# Patient Record
Sex: FEMALE | Race: OTHER | Employment: UNEMPLOYED | URBAN - METROPOLITAN AREA
[De-identification: names, ages, dates, MRNs, and addresses within clinical notes are randomized per-mention and may not be internally consistent; named-entity substitution may affect disease eponyms.]

---

## 2022-10-10 LAB
CHLAMYDIA, EXTERNAL: NEGATIVE
HBSAG, EXTERNAL: NON REACTIVE
HEPATITIS C AB,   EXT: NEGATIVE
HIV, EXTERNAL: NON REACTIVE
N. GONORRHEA, EXTERNAL: NEGATIVE
RUBELLA, EXTERNAL: NORMAL
T. PALLIDUM, EXTERNAL: NON REACTIVE
TYPE, ABO & RH, EXTERNAL: NORMAL

## 2023-01-05 LAB — T. PALLIDUM, EXTERNAL: NON REACTIVE

## 2023-02-28 LAB — GRBS, EXTERNAL: POSITIVE

## 2023-03-20 ENCOUNTER — ANESTHESIA (OUTPATIENT)
Dept: LABOR AND DELIVERY | Age: 33
DRG: 542 | End: 2023-03-20
Payer: MEDICAID

## 2023-03-20 ENCOUNTER — ANESTHESIA EVENT (OUTPATIENT)
Dept: LABOR AND DELIVERY | Age: 33
DRG: 542 | End: 2023-03-20
Payer: MEDICAID

## 2023-03-20 ENCOUNTER — HOSPITAL ENCOUNTER (INPATIENT)
Age: 33
LOS: 2 days | Discharge: HOME | DRG: 542 | End: 2023-03-22
Attending: STUDENT IN AN ORGANIZED HEALTH CARE EDUCATION/TRAINING PROGRAM | Admitting: OBSTETRICS & GYNECOLOGY
Payer: MEDICAID

## 2023-03-20 PROBLEM — Z37.9 NORMAL LABOR: Status: ACTIVE | Noted: 2023-03-20

## 2023-03-20 LAB
ABO + RH BLD: NORMAL
AMPHET UR QL SCN: NEGATIVE
BARBITURATES UR QL SCN: NEGATIVE
BASOPHILS # BLD: 0.1 K/UL (ref 0–0.1)
BASOPHILS NFR BLD: 1 % (ref 0–1)
BENZODIAZ UR QL: NEGATIVE
BLOOD GROUP ANTIBODIES SERPL: NORMAL
CANNABINOIDS UR QL SCN: NEGATIVE
COCAINE UR QL SCN: NEGATIVE
DIFFERENTIAL METHOD BLD: ABNORMAL
DRUG SCRN COMMENT,DRGCM: NORMAL
EOSINOPHIL # BLD: 0.2 K/UL (ref 0–0.4)
EOSINOPHIL NFR BLD: 1 % (ref 0–7)
ERYTHROCYTE [DISTWIDTH] IN BLOOD BY AUTOMATED COUNT: 13.7 % (ref 11.5–14.5)
HCT VFR BLD AUTO: 39.5 % (ref 35–47)
HGB BLD-MCNC: 13.4 G/DL (ref 11.5–16)
IMM GRANULOCYTES # BLD AUTO: 0.1 K/UL (ref 0–0.04)
IMM GRANULOCYTES NFR BLD AUTO: 1 % (ref 0–0.5)
LYMPHOCYTES # BLD: 2.7 K/UL (ref 0.8–3.5)
LYMPHOCYTES NFR BLD: 23 % (ref 12–49)
MCH RBC QN AUTO: 31.1 PG (ref 26–34)
MCHC RBC AUTO-ENTMCNC: 33.9 G/DL (ref 30–36.5)
MCV RBC AUTO: 91.6 FL (ref 80–99)
METHADONE UR QL: NEGATIVE
MONOCYTES # BLD: 1 K/UL (ref 0–1)
MONOCYTES NFR BLD: 8 % (ref 5–13)
NEUTS SEG # BLD: 8 K/UL (ref 1.8–8)
NEUTS SEG NFR BLD: 66 % (ref 32–75)
NRBC # BLD: 0 K/UL (ref 0–0.01)
NRBC BLD-RTO: 0 PER 100 WBC
OPIATES UR QL: NEGATIVE
PCP UR QL: NEGATIVE
PLATELET # BLD AUTO: 212 K/UL (ref 150–400)
PMV BLD AUTO: 11.2 FL (ref 8.9–12.9)
RBC # BLD AUTO: 4.31 M/UL (ref 3.8–5.2)
SPECIMEN EXP DATE BLD: NORMAL
WBC # BLD AUTO: 11.9 K/UL (ref 3.6–11)

## 2023-03-20 PROCEDURE — 74011000250 HC RX REV CODE- 250: Performed by: ANESTHESIOLOGY

## 2023-03-20 PROCEDURE — 75410000002 HC LABOR FEE PER 1 HR

## 2023-03-20 PROCEDURE — 74011250636 HC RX REV CODE- 250/636

## 2023-03-20 PROCEDURE — 00HU33Z INSERTION OF INFUSION DEVICE INTO SPINAL CANAL, PERCUTANEOUS APPROACH: ICD-10-PCS | Performed by: ANESTHESIOLOGY

## 2023-03-20 PROCEDURE — 80307 DRUG TEST PRSMV CHEM ANLYZR: CPT

## 2023-03-20 PROCEDURE — 74011000250 HC RX REV CODE- 250: Performed by: OBSTETRICS & GYNECOLOGY

## 2023-03-20 PROCEDURE — 74011000258 HC RX REV CODE- 258: Performed by: OBSTETRICS & GYNECOLOGY

## 2023-03-20 PROCEDURE — 85025 COMPLETE CBC W/AUTO DIFF WBC: CPT

## 2023-03-20 PROCEDURE — 75410000000 HC DELIVERY VAGINAL/SINGLE

## 2023-03-20 PROCEDURE — 74011000250 HC RX REV CODE- 250

## 2023-03-20 PROCEDURE — 36415 COLL VENOUS BLD VENIPUNCTURE: CPT

## 2023-03-20 PROCEDURE — 76060000078 HC EPIDURAL ANESTHESIA

## 2023-03-20 PROCEDURE — 75410000003 HC RECOV DEL/VAG/CSECN EA 0.5 HR

## 2023-03-20 PROCEDURE — 0DQR0ZZ REPAIR ANAL SPHINCTER, OPEN APPROACH: ICD-10-PCS | Performed by: OBSTETRICS & GYNECOLOGY

## 2023-03-20 PROCEDURE — 74011250637 HC RX REV CODE- 250/637: Performed by: OBSTETRICS & GYNECOLOGY

## 2023-03-20 PROCEDURE — 86900 BLOOD TYPING SEROLOGIC ABO: CPT

## 2023-03-20 PROCEDURE — 65410000002 HC RM PRIVATE OB

## 2023-03-20 PROCEDURE — 77030014125 HC TY EPDRL BBMI -B: Performed by: ANESTHESIOLOGY

## 2023-03-20 PROCEDURE — 76815 OB US LIMITED FETUS(S): CPT

## 2023-03-20 PROCEDURE — 74011250636 HC RX REV CODE- 250/636: Performed by: OBSTETRICS & GYNECOLOGY

## 2023-03-20 PROCEDURE — 10907ZC DRAINAGE OF AMNIOTIC FLUID, THERAPEUTIC FROM PRODUCTS OF CONCEPTION, VIA NATURAL OR ARTIFICIAL OPENING: ICD-10-PCS | Performed by: OBSTETRICS & GYNECOLOGY

## 2023-03-20 RX ORDER — EPHEDRINE SULFATE/0.9% NACL/PF 50 MG/5 ML
12.5 SYRINGE (ML) INTRAVENOUS ONCE
Status: COMPLETED | OUTPATIENT
Start: 2023-03-20 | End: 2023-03-20

## 2023-03-20 RX ORDER — WATER FOR INJECTION,STERILE
VIAL (ML) INJECTION
Status: DISPENSED
Start: 2023-03-20 | End: 2023-03-21

## 2023-03-20 RX ORDER — SODIUM CHLORIDE 0.9 % (FLUSH) 0.9 %
5-40 SYRINGE (ML) INJECTION EVERY 8 HOURS
Status: DISCONTINUED | OUTPATIENT
Start: 2023-03-20 | End: 2023-03-22

## 2023-03-20 RX ORDER — BUPIVACAINE HYDROCHLORIDE AND EPINEPHRINE 2.5; 5 MG/ML; UG/ML
INJECTION, SOLUTION EPIDURAL; INFILTRATION; INTRACAUDAL; PERINEURAL AS NEEDED
Status: DISCONTINUED | OUTPATIENT
Start: 2023-03-20 | End: 2023-03-20 | Stop reason: HOSPADM

## 2023-03-20 RX ORDER — OXYCODONE AND ACETAMINOPHEN 5; 325 MG/1; MG/1
1 TABLET ORAL
Status: DISCONTINUED | OUTPATIENT
Start: 2023-03-20 | End: 2023-03-22 | Stop reason: HOSPADM

## 2023-03-20 RX ORDER — FENTANYL/BUPIVACAINE/NS/PF 2-1250MCG
SYRINGE (ML) EPIDURAL
Status: COMPLETED
Start: 2023-03-20 | End: 2023-03-20

## 2023-03-20 RX ORDER — OXYTOCIN/RINGER'S LACTATE 30/500 ML
87.3 PLASTIC BAG, INJECTION (ML) INTRAVENOUS AS NEEDED
Status: DISCONTINUED | OUTPATIENT
Start: 2023-03-20 | End: 2023-03-21

## 2023-03-20 RX ORDER — EPHEDRINE SULFATE/0.9% NACL/PF 50 MG/5 ML
12.5 SYRINGE (ML) INTRAVENOUS
Status: ACTIVE | OUTPATIENT
Start: 2023-03-20 | End: 2023-03-21

## 2023-03-20 RX ORDER — SODIUM CHLORIDE, SODIUM LACTATE, POTASSIUM CHLORIDE, CALCIUM CHLORIDE 600; 310; 30; 20 MG/100ML; MG/100ML; MG/100ML; MG/100ML
125 INJECTION, SOLUTION INTRAVENOUS CONTINUOUS
Status: DISCONTINUED | OUTPATIENT
Start: 2023-03-20 | End: 2023-03-20

## 2023-03-20 RX ORDER — CEFAZOLIN SODIUM 1 G/3ML
INJECTION, POWDER, FOR SOLUTION INTRAMUSCULAR; INTRAVENOUS
Status: DISPENSED
Start: 2023-03-20 | End: 2023-03-21

## 2023-03-20 RX ORDER — NALOXONE HYDROCHLORIDE 0.4 MG/ML
0.4 INJECTION, SOLUTION INTRAMUSCULAR; INTRAVENOUS; SUBCUTANEOUS AS NEEDED
Status: DISCONTINUED | OUTPATIENT
Start: 2023-03-20 | End: 2023-03-21

## 2023-03-20 RX ORDER — SODIUM CHLORIDE 0.9 % (FLUSH) 0.9 %
5-40 SYRINGE (ML) INJECTION EVERY 8 HOURS
Status: DISCONTINUED | OUTPATIENT
Start: 2023-03-20 | End: 2023-03-20

## 2023-03-20 RX ORDER — NALOXONE HYDROCHLORIDE 0.4 MG/ML
0.4 INJECTION, SOLUTION INTRAMUSCULAR; INTRAVENOUS; SUBCUTANEOUS AS NEEDED
Status: DISCONTINUED | OUTPATIENT
Start: 2023-03-20 | End: 2023-03-22 | Stop reason: HOSPADM

## 2023-03-20 RX ORDER — SODIUM CHLORIDE 0.9 % (FLUSH) 0.9 %
5-40 SYRINGE (ML) INJECTION AS NEEDED
Status: DISCONTINUED | OUTPATIENT
Start: 2023-03-20 | End: 2023-03-22

## 2023-03-20 RX ORDER — DOCUSATE SODIUM 100 MG/1
100 CAPSULE, LIQUID FILLED ORAL 2 TIMES DAILY
Status: DISCONTINUED | OUTPATIENT
Start: 2023-03-20 | End: 2023-03-22 | Stop reason: HOSPADM

## 2023-03-20 RX ORDER — OXYTOCIN/RINGER'S LACTATE 30/500 ML
10 PLASTIC BAG, INJECTION (ML) INTRAVENOUS AS NEEDED
Status: COMPLETED | OUTPATIENT
Start: 2023-03-20 | End: 2023-03-20

## 2023-03-20 RX ORDER — BUPIVACAINE HYDROCHLORIDE AND EPINEPHRINE 2.5; 5 MG/ML; UG/ML
INJECTION, SOLUTION EPIDURAL; INFILTRATION; INTRACAUDAL; PERINEURAL
Status: COMPLETED
Start: 2023-03-20 | End: 2023-03-20

## 2023-03-20 RX ORDER — HYDROCORTISONE ACETATE PRAMOXINE HCL 2.5; 1 G/100G; G/100G
CREAM TOPICAL AS NEEDED
Status: DISCONTINUED | OUTPATIENT
Start: 2023-03-20 | End: 2023-03-22 | Stop reason: HOSPADM

## 2023-03-20 RX ORDER — SODIUM CHLORIDE 0.9 % (FLUSH) 0.9 %
5-40 SYRINGE (ML) INJECTION AS NEEDED
Status: DISCONTINUED | OUTPATIENT
Start: 2023-03-20 | End: 2023-03-21

## 2023-03-20 RX ORDER — OXYTOCIN/RINGER'S LACTATE 30/500 ML
PLASTIC BAG, INJECTION (ML) INTRAVENOUS
Status: COMPLETED
Start: 2023-03-20 | End: 2023-03-20

## 2023-03-20 RX ORDER — IBUPROFEN 400 MG/1
800 TABLET ORAL EVERY 8 HOURS
Status: DISCONTINUED | OUTPATIENT
Start: 2023-03-20 | End: 2023-03-22 | Stop reason: HOSPADM

## 2023-03-20 RX ORDER — FENTANYL/BUPIVACAINE/NS/PF 2-1250MCG
1-16 SYRINGE (ML) EPIDURAL CONTINUOUS
Status: DISCONTINUED | OUTPATIENT
Start: 2023-03-20 | End: 2023-03-20

## 2023-03-20 RX ORDER — EPHEDRINE SULFATE/0.9% NACL/PF 50 MG/5 ML
12.5 SYRINGE (ML) INTRAVENOUS
Status: COMPLETED | OUTPATIENT
Start: 2023-03-20 | End: 2023-03-20

## 2023-03-20 RX ADMIN — Medication 12 ML/HR: at 01:58

## 2023-03-20 RX ADMIN — DOCUSATE SODIUM 100 MG: 100 CAPSULE ORAL at 18:11

## 2023-03-20 RX ADMIN — Medication 10000 MILLI-UNITS: at 16:17

## 2023-03-20 RX ADMIN — SODIUM CHLORIDE 2.5 MILLION UNITS: 9 INJECTION, SOLUTION INTRAVENOUS at 11:42

## 2023-03-20 RX ADMIN — SODIUM CHLORIDE, POTASSIUM CHLORIDE, SODIUM LACTATE AND CALCIUM CHLORIDE 999 ML/HR: 600; 310; 30; 20 INJECTION, SOLUTION INTRAVENOUS at 01:18

## 2023-03-20 RX ADMIN — Medication 12 ML/HR: at 08:39

## 2023-03-20 RX ADMIN — Medication 12 ML/HR: at 14:41

## 2023-03-20 RX ADMIN — SODIUM CHLORIDE, POTASSIUM CHLORIDE, SODIUM LACTATE AND CALCIUM CHLORIDE 125 ML/HR: 600; 310; 30; 20 INJECTION, SOLUTION INTRAVENOUS at 11:43

## 2023-03-20 RX ADMIN — OXYTOCIN 10000 MILLI-UNITS: 10 INJECTION INTRAVENOUS at 16:17

## 2023-03-20 RX ADMIN — BUPIVACAINE HYDROCHLORIDE AND EPINEPHRINE 4 ML: 2.5; 5 INJECTION, SOLUTION EPIDURAL; INFILTRATION; INTRACAUDAL; PERINEURAL at 01:52

## 2023-03-20 RX ADMIN — IBUPROFEN 800 MG: 400 TABLET ORAL at 18:11

## 2023-03-20 RX ADMIN — Medication 12.5 MG: at 03:05

## 2023-03-20 RX ADMIN — SODIUM CHLORIDE 5 MILLION UNITS: 900 INJECTION INTRAVENOUS at 01:51

## 2023-03-20 RX ADMIN — SODIUM CHLORIDE 2.5 MILLION UNITS: 9 INJECTION, SOLUTION INTRAVENOUS at 07:08

## 2023-03-20 RX ADMIN — BUPIVACAINE HYDROCHLORIDE AND EPINEPHRINE 3 ML: 2.5; 5 INJECTION, SOLUTION EPIDURAL; INFILTRATION; INTRACAUDAL; PERINEURAL at 01:51

## 2023-03-20 RX ADMIN — BUPIVACAINE HYDROCHLORIDE AND EPINEPHRINE 3 ML: 2.5; 5 INJECTION, SOLUTION EPIDURAL; INFILTRATION; INTRACAUDAL; PERINEURAL at 01:50

## 2023-03-20 RX ADMIN — CEFAZOLIN 2 G: 1 INJECTION, POWDER, FOR SOLUTION INTRAMUSCULAR; INTRAVENOUS at 16:37

## 2023-03-20 RX ADMIN — Medication 12.5 MG: at 02:28

## 2023-03-20 RX ADMIN — SODIUM CHLORIDE, POTASSIUM CHLORIDE, SODIUM LACTATE AND CALCIUM CHLORIDE 125 ML/HR: 600; 310; 30; 20 INJECTION, SOLUTION INTRAVENOUS at 01:50

## 2023-03-20 NOTE — ANESTHESIA PREPROCEDURE EVALUATION
Relevant Problems   No relevant active problems       Anesthetic History   No history of anesthetic complications            Review of Systems / Medical History  Patient summary reviewed and pertinent labs reviewed    Pulmonary  Within defined limits                 Neuro/Psych   Within defined limits           Cardiovascular  Within defined limits                Exercise tolerance: >4 METS     GI/Hepatic/Renal  Within defined limits              Endo/Other  Within defined limits           Other Findings              Physical Exam    Airway  Mallampati: II  TM Distance: > 6 cm  Neck ROM: normal range of motion   Mouth opening: Normal     Cardiovascular  Regular rate and rhythm,  S1 and S2 normal,  no murmur, click, rub, or gallop  Rhythm: regular  Rate: normal         Dental  No notable dental hx       Pulmonary  Breath sounds clear to auscultation               Abdominal  GI exam deferred       Other Findings            Anesthetic Plan    ASA: 2  Anesthesia type: epidural      Post-op pain plan if not by surgeon: indwelling epidural catheter    Induction: Intravenous  Anesthetic plan and risks discussed with: Patient

## 2023-03-20 NOTE — PROGRESS NOTES
Problem: Vaginal Delivery: Day of Deliver-Laboring  Goal: Activity/Safety  Outcome: Progressing Towards Goal  Goal: Consults, if ordered  Outcome: Progressing Towards Goal  Goal: Diagnostic Test/Procedures  Outcome: Progressing Towards Goal  Goal: Nutrition/Diet  Outcome: Progressing Towards Goal  Goal: Discharge Planning  Outcome: Progressing Towards Goal  Goal: Medications  Outcome: Progressing Towards Goal  Goal: Respiratory  Outcome: Progressing Towards Goal  Goal: Treatments/Interventions/Procedures  Outcome: Progressing Towards Goal  Goal: *Vital signs within defined limits  Outcome: Progressing Towards Goal  Goal: *Labs within defined limits  Outcome: Progressing Towards Goal  Goal: *Hemodynamically stable  Outcome: Progressing Towards Goal  Goal: *Optimal pain control at patient's stated goal  Outcome: Progressing Towards Goal

## 2023-03-20 NOTE — PROGRESS NOTES
0708: Bedside and Verbal shift change report given to BERTHA Rico, RN (oncoming nurse) by IRAIS Dickens [facility], [address] [place] [address] (offgoing nurse). Report included the following information SBAR and Kardex. 0170: Dr. Aleksander Staples at bedside for introduction     : Dr. Aleksander Staples at bedside for evaluation. AROM and SVE at this time     1303: Dr. Aleksander Staples at bedside for evaluation     1311: Patient begins pushing. RN remains in continuous attendance at the bedside. Assessment & evaluation of fetal heart rate ongoing via continuous EFM. 1512: pt pushing on R side     1528: pt pushing in knee/chest position     1550: Dr. Aleksander Staples at bedside for evaluation and to assess progression of labor. 8206-1365: Dr. Aleksander Staples at bedside for evaluation and to assess progression of labor. 1607:  of live baby girl     80: Dr. Aleksander Staples straight cathed pt at this time     1908: Bedside and Verbal shift change report given to A. Vergil Fothergill, RN (oncoming nurse) by BERTHA Rico RN (offgoing nurse). Report included the following information SBAR and Kardex. [facility]  Interdisciplinary Discharge Instructions      Date of Discharge: 7/11/2019       Ambulatory: Yes  Time of Discharge: Pending     Transportation: TBD  Residence Upon Discharge: Home          Sharps / Valuables Returned: Yes    Discharge Medications: Yes    Discharge Prescriptions: Yes    Instructions given by: RN  Patients's own medication returned: N/A    Written educational materials given: Yes  Satisfaction survey completed: Unknown      I have received instruction in these areas and have had an opportunity to ask questions, understand what has been discussed, and have received a copy of this form.or therapeutic reasons of current patients and for your continued recovery, visitation is not allowed on this unit for thirty days.      _____________________________________ ________________  Signature of Patient     Date/Time      _____________________________________ ________________  Signature of POA/Guardian/Parent              Date/Time      _____________________________________ ________________  Signature of Caregiver    Date/Time          Medication Drop Box Locations - NO NEEDLES IN MEDICATION DROP BOXES    *Liquid Medications must be in ORIGINAL bottles and securely closed. Please place the   bottle in a sealed plastic bag before depositing it in the box.    *It is preferred that medications remain in their original packaging or prescription bottle.    Cross out your name & address, but DO NOT cover up the name of the medication.      [place] [employer]  [address] [facility]. [phone]    [place] [employer] [address].  [phone]    [place] [employer] [address] [facility]. [phone]    [place] [employer] [address]. [phone]    [place] [employer] [address] [facility]. [phone]      Where to dispose needles?    [facility]'s Emergency Department in  [place] maintains a free drop off box for needles.  Contaminated sharps must be placed in an approved red, plastic, puncture-proof container or a used plastic laundry detergent bottle labeled \"sharps\" - No larger than 11 x 7.5 inches.

## 2023-03-20 NOTE — PROGRESS NOTES
Problem: Vaginal Delivery: Day of Deliver-Laboring  Goal: Activity/Safety  3/20/2023 1623 by Enrique Rae, RN  Outcome: Resolved/Not Met  3/20/2023 0719 by Enrique Rae, RN  Outcome: Progressing Towards Goal  Goal: Consults, if ordered  3/20/2023 1623 by Enrique Rae, RN  Outcome: Resolved/Not Met  3/20/2023 0719 by Enrique Rae, RN  Outcome: Progressing Towards Goal  Goal: Diagnostic Test/Procedures  3/20/2023 1623 by Enrique Rae, RN  Outcome: Resolved/Not Met  3/20/2023 0719 by Enrique Rae, RN  Outcome: Progressing Towards Goal  Goal: Nutrition/Diet  3/20/2023 1623 by Enrique Rae, RN  Outcome: Resolved/Not Met  3/20/2023 0719 by Enrique Rae, RN  Outcome: Progressing Towards Goal  Goal: Discharge Planning  3/20/2023 1623 by Enrique Rae, RN  Outcome: Resolved/Not Met  3/20/2023 0719 by Enrique Rae, RN  Outcome: Progressing Towards Goal  Goal: Medications  3/20/2023 1623 by Enrique Rae, RN  Outcome: Resolved/Not Met  3/20/2023 0719 by Enrique Rae, RN  Outcome: Progressing Towards Goal  Goal: Respiratory  3/20/2023 1623 by Enrique Rae, RN  Outcome: Resolved/Not Met  3/20/2023 0719 by Enrique Rae, RN  Outcome: Progressing Towards Goal  Goal: Treatments/Interventions/Procedures  3/20/2023 1623 by Enrique Rae, RN  Outcome: Resolved/Not Met  3/20/2023 0719 by Enrique Rae, RN  Outcome: Progressing Towards Goal  Goal: *Vital signs within defined limits  3/20/2023 1623 by Enrique Rae, RN  Outcome: Resolved/Not Met  3/20/2023 0719 by Enrique Rae, RN  Outcome: Progressing Towards Goal  Goal: *Labs within defined limits  3/20/2023 1623 by Enrique Rae, RN  Outcome: Resolved/Not Met  3/20/2023 0719 by Enrique Rae, RN  Outcome: Progressing Towards Goal  Goal: *Hemodynamically stable  3/20/2023 1623 by Enrique Rae, RN  Outcome: Resolved/Not Met  3/20/2023 0719 by Enrique Rae, RN  Outcome: Progressing Towards Goal  Goal: *Optimal pain control at patient's stated goal  3/20/2023 1623 by Enrique Rae RN  Outcome: Resolved/Not Met  3/20/2023 0719 by Nate Jaffe, RN  Outcome: Progressing Towards Goal

## 2023-03-20 NOTE — L&D DELIVERY NOTE
Delivery Summary  Patient: Nydia Arora             Circumcision:   NA-female  Additional Delivery Comments - Pt presented in labor. She progressed without augmentation to 8/100/-1 when arom was performed. At c/c/+1 she began pushing. She pushed for almost 3 hours and fetal head was at +2 station and she was exhausted. Fetal head was OT to was rotated to 45 degrees. She was consented for vacuum assisted delivery. Her epidural was redosed. I&O cath was done to drain the bladder. Miti vac did not fit well into the introitus so a flat kiwi vacuum was applied. Over 3 contractions gentle downward traction was applied. Midline episiotomy was cut to help facilitate delivery due to tight introitus. Fetal head delivered followed easily by shoulders and body. Baby place on maternal abdomen with good cry. Cord clamped and cut after 60 sec delay. Cord blood obtained. Placenta delivered spontaneously and intact and was normal in appearance. 3rd degree perineal lac noted. It was a 3C laceration. A rectal exam revealed intact rectal mucosa. The anal sphincter muscle was identified and grasped with allis clamps. She noted some pain so 10 cc on 15 lidocaine was injected into the laceration. The anal sphincter was closed with 4 figure of 8 sutures. There were bilateral deep sulcus tears. Dr. Shayy Lozano was asked to come help retract. The sulcus tears were closed with running 2-0 and 3-0 chromic suture. There perineal body and remainder of the perineal laceration were repaired in standard fashion. 2 fingers easily went into vaginal opening. Repeat rectal exam revealed intact mucosa and no palpable suture. The lacerations were hemostatic, uterus was firm and bleeding was appropriate.     Information for the patient's :  Daniel Joseph [[id number]]     Delivery Type: Vaginal, Spontaneous   Delivery Date: 3/20/2023   Delivery Time: 4:07 PM     Birth Weight:       Sex:  female  Delivery Clinician:  Sarbjit Moore Gestational Age: 38w3d    Presentation: Vertex   Position:             Apgars were 8  and 9      Resuscitation Method: Suctioning-tracheal;Suctioning-bulb     Meconium Stained: None    Living Status: Living       Placenta Date/Time: 3/20/2023  4:17 PM   Placenta Removal: Spontaneous   Placenta Appearance: Intact    Cord Information:      Cord Events:         Disposition of Cord Blood:      Blood Gases Sent?:        Cord pH:  none    Episiotomy:    Laceration(s): 3rd     Estimated Blood Loss (ml): No data found    Labor Events  Method: None      Augmentation: AROM   Cervical Ripening:     None        Operative Vaginal Delivery - Consent & Procedure: The indications, risks, and benefits of operative vaginal delivery compared to  delivery were discussed with the patient and attendant family member. All questions were answered. Bladder was drained prior to instrumentation.  Description of procedure: as above

## 2023-03-20 NOTE — PROGRESS NOTES
Problem: Vaginal Delivery: Day of Deliver-Laboring  Goal: Activity/Safety  Outcome: Progressing Towards Goal  Goal: Diagnostic Test/Procedures  Outcome: Progressing Towards Goal  Goal: Nutrition/Diet  Outcome: Progressing Towards Goal  Goal: Discharge Planning  Outcome: Progressing Towards Goal  Goal: Medications  Outcome: Progressing Towards Goal  Goal: Respiratory  Outcome: Progressing Towards Goal  Goal: Treatments/Interventions/Procedures  Outcome: Progressing Towards Goal  Goal: *Vital signs within defined limits  Outcome: Progressing Towards Goal  Goal: *Labs within defined limits  Outcome: Progressing Towards Goal  Goal: *Hemodynamically stable  Outcome: Progressing Towards Goal  Goal: *Optimal pain control at patient's stated goal  Outcome: Progressing Towards Goal

## 2023-03-20 NOTE — PROGRESS NOTES
Epidural.  FHT:  130 baseline, moderate variability, +15x15 accels, no decels, Cat I/reactive  [place]:  q 2 min spontaneously  RN exam at 0544 -> 8/C/0/intact    A:  32 yo G1 at 39+3 laboring well spontaneously. Reassuring fetal status. GBS pos. Rh pos.  RI.    P:  1. Continue to monitor  2. AROM if indicated (currently progressing well without intervention)  3. Continue PCN GBS proph  4.   Anticipate vaginal delivery

## 2023-03-20 NOTE — H&P
OB H&P    Patient: Cory Desai Age: 33 y.o. Sex: female    YOB: 1990 Admit Date: 3/20/2023 PCP: No primary care provider on file. MRN: [id number]  CSN: [id number]     Room: [address] [place] [address] Time Dictated: 12:50 AM        Chief Complaint   Contractions     History of Present Illness   33 y.o. G1 at 39+3 presents c/o regular, painful uterine contractions that started approximately 1.5 hours ago. Denies LOF/VB.  +FM. Reports some hand/pedal edema. Denies HA/vision changes/RUQ pain. Desires epidural soonest.  Speaks Farsi (desired  as ). Primary OB:  [facility]:  - Cervical polyp at os  - GBS pos    PNL:  O pos  Ab neg  TPA NR  RI  HBsAg NR  HCV Ab neg  HIV NR  GC/Chlam neg/neg  1 hr   GBS neg    OB History    Para Term  AB Living   1             SAB IAB Ectopic Molar Multiple Live Births                    # Outcome Date GA Lbr Ang/2nd Weight Sex Delivery Anes PTL Lv   1 Current                 Review of Systems   Review of Systems   Constitutional:  Negative for chills and fever. HENT:  Negative for congestion, facial swelling and sore throat. Eyes:  Negative for visual disturbance. Respiratory:  Negative for cough, shortness of breath and wheezing. Cardiovascular:  Positive for leg swelling. Negative for chest pain and palpitations. Gastrointestinal:  Positive for abdominal pain. Negative for constipation, diarrhea, nausea and vomiting. Endocrine: Negative. Genitourinary:  Negative for dysuria, vaginal bleeding and vaginal discharge. Musculoskeletal:  Positive for back pain. Skin: Negative. Allergic/Immunologic: Negative for immunocompromised state. Neurological:  Negative for light-headedness and headaches. Hematological: Negative. Psychiatric/Behavioral: Negative.         Past Medical/Surgical History     PMHX:  Reviewed and none    Past Surgical History:   Procedure Laterality Date    HX CYST REMOVAL - ovarian via laparatomy (outside of [place])         Social History     Social History     Socioeconomic History    Marital status:      Spouse name: Not on file    Number of children: None    Years of education: Not on file    Highest education level: Not on file   Occupational History    Not on file   Tobacco Use    Smoking status: Never    Smokeless tobacco: Never   Vaping Use    Vaping Use: Never used   Substance and Sexual Activity    Alcohol use: Never    Drug use: Never    Sexual activity: Not on file   Other Topics Concern     Service No    Blood Transfusions No    Caffeine Concern No    Occupational Exposure No    Hobby Hazards No    Sleep Concern No    Stress Concern No    Weight Concern No    Special Diet No    Back Care No    Exercise No    Bike Helmet No    Seat Belt Yes    Self-Exams No   Social History Narrative    Not on file       Family History     Reviewed and none    Current Medications     Prior to Admission Medications   Prescriptions Last Dose Informant Patient Reported? Taking? PNV Comb #2-Iron-Omega 3-FA [id number] mg cmpk 3/19/2023  Yes Yes   Sig: Take  by mouth. Indications: pregnancy      Facility-Administered Medications: None       Allergies   No Known Allergies    Physical Exam   Patient Vitals for the past 12 hrs:   Temp Pulse Resp BP SpO2   03/20/23 0041 97.8 °F (36.6 °C) 73 18 117/76 99 %     ED Triage Vitals [03/20/23 0035]   ED Encounter Vitals Group      BP       Pulse       Resp       Temp       Temp src       SpO2       Weight 148 lb      Height 5' 5\"     Physical Exam  Vitals and nursing note reviewed. Exam conducted with a chaperone present. Constitutional:       General: She is in acute distress. Appearance: She is normal weight. She is not ill-appearing. HENT:      Head: Normocephalic and atraumatic. Nose: Nose normal.      Mouth/Throat:      Mouth: Mucous membranes are moist.   Eyes:      General: No scleral icterus.      Conjunctiva/sclera: Conjunctivae normal.   Cardiovascular:      Rate and Rhythm: Normal rate and regular rhythm. Heart sounds: Normal heart sounds. No murmur heard. Pulmonary:      Effort: Pulmonary effort is normal. No respiratory distress. Breath sounds: Normal breath sounds. No wheezing. Abdominal:      General: There is no distension. Palpations: Abdomen is soft. Tenderness: There is no abdominal tenderness. There is no guarding. Genitourinary:     General: Normal vulva. Vagina: No vaginal discharge. Rectum: Normal.   Musculoskeletal:         General: No swelling. Skin:     General: Skin is warm and dry. Neurological:      Mental Status: She is alert and oriented to person, place, and time. Psychiatric:         Mood and Affect: Mood normal.         Behavior: Behavior normal.         Thought Content: Thought content normal.         Judgment: Judgment normal.         OB Exam     Fetal Assessment: 130 baseline, moderate variability, +accels, no decels, Cat I  [place]:  q 2 -4 min    SVE:4/C/0/cephalic      Final Diagnosis     34 yo G1 at 39+3. Labor. Reassuring fetal status. GBS pos. Rh pos. RI. Disposition     Admit for delivery   PIV/CBC/T&S/UDS  PCN GBS proph  Epidural prn  Augment/AROM prn  Anticipate vaginal delivery   for standard fetal/maternal indications. Chalino Ramírez MD  [facility]. Mo Leon MD, [facility]  2023  12:50 AM      My signature above authenticates this document and my orders, the final    diagnosis(es), discharge prescription(s), and instructions in the Epic    record. Nursing notes have been reviewed by myself.

## 2023-03-20 NOTE — ANESTHESIA PROCEDURE NOTES
Epidural Block    Patient location during procedure: OB  Start time: 3/20/2023 1:45 AM  End time: 3/20/2023 1:53 AM  Reason for block: labor epidural  Staffing  Performed: attending   Anesthesiologist: Alonso Urbina MD  Preanesthetic Checklist  Completed: patient identified, IV checked, site marked, risks and benefits discussed, surgical consent, monitors and equipment checked, pre-op evaluation, timeout performed and fire risk safety assessment completed and verbalized  Block Placement  Patient position: sitting  Prep: DuraPrep  Sterility prep: cap, drape, gloves, hand and mask  Sedation level: no sedation  Patient monitoring: heart rate, continuous pulse oximetry and frequent blood pressure checks  Approach: midline  Location: lumbar  Lumbar location: L3-L4  Epidural  Loss of resistance technique: air  Guidance: landmark technique  Needle  Needle type: Tuohy   Needle gauge: 17 G  Needle length: 9 cm  Needle insertion depth: 4.5 cm  Catheter type: end hole  Catheter size: 19 G  Catheter at skin depth: 8.5 cm  Catheter securement method: clear occlusive dressing and surgical tape  Test dose: negative  Assessment  Block outcome: pain improved  Number of attempts: 1  Procedure assessment: patient tolerated procedure well with no immediate complications

## 2023-03-20 NOTE — PROGRESS NOTES
Diego Acosta is a 33 y.o.   at 39w3d patient of Dr Hill Smith at [facility] who presents to L&D triage with c/o contractions since 0 on 3/19/23. She reports Positive FM, denies LOF. She also denies Headaches, [place], and RUQ pain. Urine sample obtained. EFM and toco placed for initial assessment. 0057 Pt moved to room Aquilino Purdyscarolynn Ortiz at bedside. POC discussed. Strip reviewed. Anesthesia at bedside at 0138. Patient positioned to side of the bed, EFM & TOCO adjusted to accommodate sterile field. Difficulty tracing due to maternal position. Time out completed at 0139. Successful epidural is completed by Dr Chaim Chase. Patient is repositioned supine in bed with wedge under right hip. EFM and TOCO replaced and blood pressure frequency increased. Roberson catheter placed and epidural continuous infusion is started. Pt c/o shaking and feeling cold. Temp taken- 97.6. Temperature in room increased and extra blanket given. Will continue to monitor. 0230 TC to Dr. Lilia Red regarding pt b/p. New orders received. Bedside shift change report given to BERTHA Rico RN (oncoming nurse) by IRAIS Uriarte and CAROLINA Dickens [facility], [address] [place] [address] (offgoing nurse). Report included the following information SBAR and Kardex.

## 2023-03-21 PROCEDURE — 74011250637 HC RX REV CODE- 250/637: Performed by: OBSTETRICS & GYNECOLOGY

## 2023-03-21 PROCEDURE — 65410000002 HC RM PRIVATE OB

## 2023-03-21 RX ADMIN — DOCUSATE SODIUM 100 MG: 100 CAPSULE ORAL at 08:12

## 2023-03-21 RX ADMIN — IBUPROFEN 800 MG: 400 TABLET ORAL at 01:22

## 2023-03-21 RX ADMIN — OXYCODONE AND ACETAMINOPHEN 1 TABLET: 5; 325 TABLET ORAL at 01:22

## 2023-03-21 RX ADMIN — IBUPROFEN 800 MG: 400 TABLET ORAL at 08:12

## 2023-03-21 RX ADMIN — IBUPROFEN 800 MG: 400 TABLET ORAL at 17:05

## 2023-03-21 RX ADMIN — OXYCODONE AND ACETAMINOPHEN 1 TABLET: 5; 325 TABLET ORAL at 09:33

## 2023-03-21 RX ADMIN — DOCUSATE SODIUM 100 MG: 100 CAPSULE ORAL at 17:05

## 2023-03-21 RX ADMIN — OXYCODONE AND ACETAMINOPHEN 1 TABLET: 5; 325 TABLET ORAL at 20:59

## 2023-03-21 NOTE — PROGRESS NOTES
Post-Partum Day Number 1 Progress Note    Rola Pereira     Assessment: Doing well, post partum day 1 s/p VAVD for maternal exhaustion with midline episiotomy and 3c laceration     Plan:  - Continue routine postpartum and perineal care as well as maternal education. - 3c laceration - docusate BID, discussed importance of soft, regular bowel movements, outpatient follow up with urogyn - requested. - Plan discharge home [facility] Discharge Date: Tomorrow. Information for the patient's :  Eugenio Archibald [[id number]]   Vaginal, Vacuum (Extractor)  Patient doing well without significant complaint. Voiding without difficulty, normal lochia. Vitals:  Visit Vitals  BP (!) 94/57 (BP 1 Location: Left upper arm, BP Patient Position: At rest)   Pulse 62   Temp 97.8 °F (36.6 °C)   Resp 16   Ht 5' 5\" (1.651 m)   Wt 67.1 kg (148 lb)   SpO2 98%   Breastfeeding Unknown   BMI 24.63 kg/m²     Temp (24hrs), Av.2 °F (36.8 °C), Min:97.8 °F (36.6 °C), Max:98.6 °F (37 °C)        Exam:   Patient without distress. Fundus firm, nontender per nursing fundal checks. Perineum with normal lochia noted per nursing assessment. Lower extremities are negative for pathological edema. Labs:     Lab Results   Component Value Date/Time    WBC 11.9 (H) 2023 01:12 AM    HGB 13.4 2023 01:12 AM    HCT 39.5 2023 01:12 AM    PLATELET 277 4723 01:12 AM       No results found for this or any previous visit (from the past 24 hour(s)).

## 2023-03-21 NOTE — PROGRESS NOTES
At 0930 RN encouarged mom to get out of bed on her own and amublate around the room as tolerated. RN instructed the pt on how to change her own pad, how to make up pads to provide relief for her lonnie area. RN assessed pain as well, administered percocet and educated the pt on side effects of the medication. RN will reassess pain.

## 2023-03-21 NOTE — PROGRESS NOTES
0725. Bedside shift change report given to JANAE Colorado RN (oncoming nurse) by IRAIS Soto RN (offgoing nurse). Report included the following information SBAR, Kardex, Intake/Output, MAR, and Recent Results.

## 2023-03-22 VITALS
BODY MASS INDEX: 24.66 KG/M2 | HEIGHT: 65 IN | DIASTOLIC BLOOD PRESSURE: 57 MMHG | OXYGEN SATURATION: 96 % | HEART RATE: 64 BPM | RESPIRATION RATE: 16 BRPM | TEMPERATURE: 97.5 F | SYSTOLIC BLOOD PRESSURE: 108 MMHG | WEIGHT: 148 LBS

## 2023-03-22 PROCEDURE — 74011250637 HC RX REV CODE- 250/637: Performed by: OBSTETRICS & GYNECOLOGY

## 2023-03-22 RX ORDER — IBUPROFEN 600 MG/1
600 TABLET ORAL
Qty: 60 TABLET | Refills: 0 | Status: SHIPPED | OUTPATIENT
Start: 2023-03-22

## 2023-03-22 RX ORDER — DOCUSATE SODIUM 100 MG/1
100 CAPSULE, LIQUID FILLED ORAL
Qty: 60 CAPSULE | Refills: 0 | Status: SHIPPED | OUTPATIENT
Start: 2023-03-22

## 2023-03-22 RX ADMIN — OXYCODONE AND ACETAMINOPHEN 1 TABLET: 5; 325 TABLET ORAL at 06:15

## 2023-03-22 RX ADMIN — DOCUSATE SODIUM 100 MG: 100 CAPSULE ORAL at 09:51

## 2023-03-22 RX ADMIN — OXYCODONE AND ACETAMINOPHEN 1 TABLET: 5; 325 TABLET ORAL at 01:44

## 2023-03-22 RX ADMIN — IBUPROFEN 800 MG: 400 TABLET ORAL at 01:44

## 2023-03-22 RX ADMIN — IBUPROFEN 800 MG: 400 TABLET ORAL at 09:51

## 2023-03-22 NOTE — PROGRESS NOTES
Problem: Patient Education: Go to Patient Education Activity  Goal: Patient/Family Education  Outcome: Progressing Towards Goal     Problem: Vaginal Delivery: Day of Deliver-Laboring  Goal: Activity/Safety  Outcome: Progressing Towards Goal  Goal: Diagnostic Test/Procedures  Outcome: Progressing Towards Goal  Goal: Nutrition/Diet  Outcome: Progressing Towards Goal  Goal: Discharge Planning  Outcome: Progressing Towards Goal  Goal: Medications  Outcome: Progressing Towards Goal  Goal: Respiratory  Outcome: Progressing Towards Goal  Goal: Treatments/Interventions/Procedures  Outcome: Progressing Towards Goal  Goal: *Vital signs within defined limits  Outcome: Progressing Towards Goal  Goal: *Labs within defined limits  Outcome: Progressing Towards Goal  Goal: *Hemodynamically stable  Outcome: Progressing Towards Goal  Goal: *Optimal pain control at patient's stated goal  Outcome: Progressing Towards Goal     Problem: Vaginal Delivery: Day of Delivery-Post delivery  Goal: Activity/Safety  Outcome: Progressing Towards Goal  Goal: Nutrition/Diet  Outcome: Progressing Towards Goal  Goal: Discharge Planning  Outcome: Progressing Towards Goal  Goal: Medications  Outcome: Progressing Towards Goal  Goal: Treatments/Interventions/Procedures  Outcome: Progressing Towards Goal  Goal: *Vital signs within defined limits  Outcome: Progressing Towards Goal  Goal: *Labs within defined limits  Outcome: Progressing Towards Goal  Goal: *Hemodynamically stable  Outcome: Progressing Towards Goal  Goal: *Optimal pain control at patient's stated goal  Outcome: Progressing Towards Goal  Goal: *Participates in infant care  Outcome: Progressing Towards Goal  Goal: *Demonstrates progressive activity  Outcome: Progressing Towards Goal  Goal: *Tolerating diet  Outcome: Progressing Towards Goal     Problem: Vaginal Delivery: Postpartum Day 1  Goal: Activity/Safety  Outcome: Progressing Towards Goal  Goal: Diagnostic Test/Procedures  Outcome: Progressing Towards Goal  Goal: Nutrition/Diet  Outcome: Progressing Towards Goal  Goal: Discharge Planning  Outcome: Progressing Towards Goal  Goal: Medications  Outcome: Progressing Towards Goal  Goal: Treatments/Interventions/Procedures  Outcome: Progressing Towards Goal  Goal: Psychosocial  Outcome: Progressing Towards Goal  Goal: *Vital signs within defined limits  Outcome: Progressing Towards Goal  Goal: *Labs within defined limits  Outcome: Progressing Towards Goal  Goal: *Hemodynamically stable  Outcome: Progressing Towards Goal  Goal: *Optimal pain control at patient's stated goal  Outcome: Progressing Towards Goal  Goal: *Participates in infant care  Outcome: Progressing Towards Goal  Goal: *Demonstrates progressive activity  Outcome: Progressing Towards Goal  Goal: *Performs self perineal care  Outcome: Progressing Towards Goal  Goal: *Appropriate parent-infant bonding  Outcome: Progressing Towards Goal  Goal: *Tolerating diet  Outcome: Progressing Towards Goal  Goal: *Performs self breast care  Outcome: Progressing Towards Goal

## 2023-03-22 NOTE — PROGRESS NOTES
Bedside shift change report given to IRAIS Drew RN (oncoming nurse) by JANAE Montes RN (offgoing nurse). Report included the following information SBAR and Kardex. Bedside shift change report given to JANAE Colorado RN (oncoming nurse) by IRAIS rDew RN (offgoing nurse). Report included the following information SBAR and Kardex.

## 2023-03-22 NOTE — DISCHARGE INSTRUCTIONS
After Your Delivery (the Postpartum Period): Care Instructions  Overview     Congratulations on the birth of your baby. Like pregnancy, the  period can be a time of excitement, china, and exhaustion. You may look at your wondrous little baby and feel happy. You may also be overwhelmed by your new sleep hours and new responsibilities. At first, babies often sleep during the days and are awake at night. They do not have a pattern or routine. They may make sudden gasps, jerk themselves awake, or look like they have crossed eyes. These are all normal, and they may even make you smile. In these first weeks after delivery, try to take good care of yourself. It may take 4 to 6 weeks to feel like yourself again, and possibly longer if you had a  birth. You will likely feel very tired for several weeks. Your days will be full of ups and downs, but lots of china as well. Follow-up care is a key part of your treatment and safety. Be sure to make and go to all appointments, and call your doctor if you are having problems. It's also a good idea to know your test results and keep a list of the medicines you take. How can you care for yourself at home? Take care of your body after delivery  Use pads instead of tampons for the bloody flow that may last as long as 2 weeks. Ease cramps with ibuprofen (Advil, Motrin). Ease soreness of hemorrhoids and the area between your vagina and rectum with ice compresses or witch hazel pads. Ease constipation by drinking lots of fluid and eating high-fiber foods. Ask your doctor about over-the-counter stool softeners. Cleanse yourself with a gentle squeeze of warm water from a bottle instead of wiping with toilet paper. Take a sitz bath in warm water several times a day. Wear a good nursing bra. Ease sore and swollen breasts with warm, wet washcloths. If you aren't breastfeeding, use ice rather than heat for breast soreness.   Your period may not start for several months if you are breastfeeding. You may bleed more, and longer at first, than you did before you got pregnant. Wait until you are healed (about 4 to 6 weeks) before you have sex. Ask your doctor when it is okay for you to have sex. Try not to travel with your baby for 5 or 6 weeks. If you take a long car trip, make frequent stops to walk around and stretch. Avoid exhaustion  Rest every day. Try to nap when your baby naps. Ask another adult to be with you for a few days after delivery. Plan for  if you have other children. Stay flexible so you can eat at odd hours and sleep when you need to. Both you and your baby are making new schedules. Plan small trips to get out of the house. Change can make you feel less tired. Ask for help with housework, cooking, and shopping. Remind yourself that your job is to care for your baby. Know about help for postpartum depression  \"Baby blues\" are common for the first 1 to 2 weeks after birth. You may cry or feel sad or irritable for no reason. Rest whenever you can. Being tired makes it harder to handle your emotions. Go for walks with your baby. Talk to your partner, friends, and family about your feelings. If your symptoms last for more than a few weeks, or if you feel very depressed, ask your doctor for help. Postpartum depression can be treated. Support groups and counseling can help. Sometimes medicine can also help. Stay healthy  Eat healthy foods so you have more energy. If you breastfeed, avoid drugs. If you quit smoking during pregnancy, try to stay smoke-free. If you choose to have a drink now and then, have only one drink, and limit the number of occasions that you have a drink. Wait to breastfeed at least 2 hours after you have a drink to reduce the amount of alcohol the baby may get in the milk. Start daily exercise after 4 to 6 weeks, but rest when you feel tired. Learn exercises to tone your belly.  Try Kegel exercises to regain strength in your pelvic muscles. You can do these exercises while you stand or sit. (If doing these exercises causes pain, stop doing them and talk with your doctor.)  Squeeze your muscles as if you were trying not to pass gas. Or squeeze your muscles as if you were stopping the flow of urine. Your belly, legs, and buttocks shouldn't move. Hold the squeeze for 3 seconds, then relax for 5 to 10 seconds. Start with 3 seconds, then add 1 second each week until you are able to squeeze for 10 seconds. Repeat the exercise 10 times a session. Do 3 to 8 sessions a day. Find a class for you and your baby that has an exercise time. If you had a  birth, give yourself a bit more time before you exercise, and be careful. When should you call for help? Share this information with your partner, family, or a friend. They can help you watch for warning signs. Call 911  anytime you think you may need emergency care. For example, call if:    You have thoughts of harming yourself, your baby, or another person. You passed out (lost consciousness). You have chest pain, are short of breath, or cough up blood. You have a seizure. Call your doctor now or seek immediate medical care if:    You have signs of hemorrhage (too much bleeding), such as:  Heavy vaginal bleeding. This means that you are soaking through one or more pads in an hour. Or you pass blood clots bigger than an egg. Feeling dizzy or lightheaded, or you feel like you may faint. Feeling so tired or weak that you cannot do your usual activities. A fast or irregular heartbeat. New or worse belly pain. You have signs of infection, such as:  A fever. Vaginal discharge that smells bad. New or worse belly pain. You have symptoms of a blood clot in your leg (called a deep vein thrombosis), such as:  Pain in the calf, back of the knee, thigh, or groin. Redness and swelling in your leg or groin.      You have signs of preeclampsia, such as:  Sudden swelling of your face, hands, or feet. New vision problems (such as dimness, blurring, or seeing spots). A severe headache. Watch closely for changes in your health, and be sure to contact your doctor if:    Your vaginal bleeding isn't decreasing. You feel sad, anxious, or hopeless for more than a few days. You are having problems with your breasts or breastfeeding. Where can you learn more? Go to http://www.dillon.com/  Enter A461 in the search box to learn more about \"After Your Delivery (the Postpartum Period): Care Instructions. \"  Current as of: February 23, 2022               Content Version: 13.4  © 1443-8506 [employer]. Care instructions adapted under license by [employer] (which disclaims liability or warranty for this information). If you have questions about a medical condition or this instruction, always ask your healthcare professional. [place] [address] any warranty or liability for your use of this information.

## 2023-03-22 NOTE — PROGRESS NOTES
Post-Partum Day Number 2 Progress Note    Rola Pereira     Assessment: Doing well, post partum day 2 from VAVD with 3c laceration p term labor. Plan:   - Discharge home today  - Follow up in office in 6 week(s) with [facility]. Will also schedule urogyn follow up. - Pain medication prescription(s) sent. - Questions answered. Information for the patient's :  Gregorio Carr [[id number]]   Vaginal, Vacuum (Extractor)  Patient doing well without significant complaint. Voiding without difficulty, normal lochia. Ready for discharge home. Vitals:  Visit Vitals  BP (!) 108/57 (BP 1 Location: Left upper arm, BP Patient Position: Lying; At rest)   Pulse 64   Temp 97.5 °F (36.4 °C)   Resp 16   Ht 5' 5\" (1.651 m)   Wt 67.1 kg (148 lb)   SpO2 96%   Breastfeeding Unknown   BMI 24.63 kg/m²     Temp (24hrs), Av.9 °F (36.6 °C), Min:97.5 °F (36.4 °C), Max:98.1 °F (36.7 °C)      Exam:        Patient without distress. Fundus firm, nontender per nursing fundal checks                Perineum with normal lochia noted per nursing assessment                Lower extremities are negative for pathological edema    Labs:       No results found for this or any previous visit (from the past 24 hour(s)).

## 2023-03-22 NOTE — DISCHARGE SUMMARY
Obstetrical Discharge Summary     Name: Paige Joya MRN: [id number]  SSN: [id number]    YOB: 1990  Age: 33 y.o. Sex: female      Admit Date: 3/20/2023    Discharge Date: 3/22/2023     Admitting Physician: Karly Stein MD     Attending Physician:  Dejon Doran MD     Admission Diagnoses: Normal labor [O80, Z37.9]    Discharge Diagnoses:   Information for the patient's :  Deepa Qualshanti [[id number]]   Delivery of a 3.38 kg female infant via Vaginal, Vacuum (Extractor) on 3/20/2023 at 4:07 PM  by Em Ramsay. Apgars were 8  and 9 . Additional Diagnoses:   Hospital Problems  Never Reviewed            Codes Class Noted POA    Normal labor ICD-10-CM: [de-identified], Z37.9  ICD-9-CM: 564  3/20/2023 Unknown          Lab Results   Component Value Date/Time    Rubella, External IMMUNE 10/10/2022 12:00 AM    GrBStrep, External POSITIVE 2023 12:00 AM       Hospital Course: VAVD with 3c laceration p term labor. Normal hospital course following the delivery. Patient Instructions:   Current Discharge Medication List        START taking these medications    Details   ibuprofen (MOTRIN) 600 mg tablet Take 1 Tablet by mouth every six (6) hours as needed for Pain. Qty: 60 Tablet, Refills: 0      docusate sodium (Colace) 100 mg capsule Take 1 Capsule by mouth two (2) times daily as needed for Constipation for up to 30 doses. Qty: 60 Capsule, Refills: 0           CONTINUE these medications which have NOT CHANGED    Details   PNV Comb #2-Iron-Omega 3-FA [id number] mg cmpk Take  by mouth. Indications: pregnancy             Disposition at Discharge: Home or self care    Condition at Discharge: Stable    Reference my discharge instructions.     Follow-up Appointments   Procedures    FOLLOW UP VISIT Appointment in: Other (Specify) Will call patient to schedule 6 wk pp visit as well as urogyn follow up     Will call patient to schedule 6 wk pp visit as well as urogyn follow up     Standing Status: Standing     Number of Occurrences:   1     Order Specific Question:   Appointment in     Answer:    Other (Specify)        Signed By:  Juan Headley MD     March 22, 2023